# Patient Record
Sex: FEMALE | Race: WHITE | Employment: UNEMPLOYED | URBAN - METROPOLITAN AREA
[De-identification: names, ages, dates, MRNs, and addresses within clinical notes are randomized per-mention and may not be internally consistent; named-entity substitution may affect disease eponyms.]

---

## 2024-10-28 ENCOUNTER — HOSPITAL ENCOUNTER (EMERGENCY)
Age: 54
Discharge: HOME | End: 2024-10-28
Attending: EMERGENCY MEDICINE
Payer: COMMERCIAL

## 2024-10-28 VITALS
BODY MASS INDEX: 36.37 KG/M2 | HEART RATE: 88 BPM | OXYGEN SATURATION: 96 % | HEIGHT: 64 IN | SYSTOLIC BLOOD PRESSURE: 150 MMHG | RESPIRATION RATE: 22 BRPM | WEIGHT: 213 LBS | DIASTOLIC BLOOD PRESSURE: 84 MMHG | TEMPERATURE: 98.5 F

## 2024-10-28 DIAGNOSIS — L76.22 POSTOPERATIVE HEMORRHAGE OF SKIN FOLLOWING NON-DERMATOLOGIC PROCEDURE: Primary | ICD-10-CM

## 2024-10-28 PROCEDURE — 99283 EMERGENCY DEPT VISIT LOW MDM: CPT

## 2024-10-28 PROCEDURE — 6370000000 HC RX 637 (ALT 250 FOR IP)

## 2024-10-28 PROCEDURE — 2500000003 HC RX 250 WO HCPCS

## 2024-10-28 RX ORDER — LIDOCAINE HYDROCHLORIDE AND EPINEPHRINE 10; 10 MG/ML; UG/ML
20 INJECTION, SOLUTION INFILTRATION; PERINEURAL ONCE
Status: COMPLETED | OUTPATIENT
Start: 2024-10-28 | End: 2024-10-28

## 2024-10-28 RX ADMIN — LIDOCAINE HYDROCHLORIDE,EPINEPHRINE BITARTRATE 20 ML: 10; .01 INJECTION, SOLUTION INFILTRATION; PERINEURAL at 21:16

## 2024-10-28 RX ADMIN — Medication: at 21:14

## 2024-10-28 ASSESSMENT — LIFESTYLE VARIABLES
HOW MANY STANDARD DRINKS CONTAINING ALCOHOL DO YOU HAVE ON A TYPICAL DAY: PATIENT DOES NOT DRINK
HOW OFTEN DO YOU HAVE A DRINK CONTAINING ALCOHOL: NEVER

## 2024-10-28 NOTE — ED PROVIDER NOTES
THE [facility]  EMERGENCY DEPARTMENT ENCOUNTER          EM RESIDENT NOTE       Date of evaluation: 10/28/2024    Chief Complaint     Post-op Problem (Pt reports having bariatric sleeve performed this morning, allergic to surgical glue so reports having to stitch close and site was slowly oozing upon discharge, facility bandage and discharge patient, woke up this evening around 1915 to bleeding to R site)      History of Present Illness     Josselin Ceballos is a 54 y.o. female who presents for evaluation of postop bleeding.  Patient reports that earlier today she had a gastric sleeve surgery which was performed laparoscopically.  Reports that her periumbilical incision has been continuously oozing blood since she left the hospital, and she awoke this evening to bleeding through her dressing, the overlying ABD pad, and onto her bed sheets.  She called her surgeon on-call, Dr. Wu, who recommended she come to the ED for bleeding control and assessment.  Has patient states that she has allergies to adhesives and surgical glue therefore she just has sutures on the inside of her incisions.  Patient denies any other symptoms, including nausea or vomiting, uncontrolled pain, difficulty urinating, blood in the urine or stool, shortness of breath, fevers, or dizziness.  She has been tolerating a clear liquid diet.    MEDICAL DECISION MAKING / ASSESSMENT / PLAN     INITIAL VITALS: BP: (!) 150/84, Temp: 98.5 °F (36.9 °C), Pulse: 88, Respirations: 22, SpO2: 96 %    Josselin Ceballos is a 54 y.o. female who presents for evaluation of bleeding from her postop surgery site in the middle of her abdomen.  She is mildly hypertensive, afebrile, otherwise hemodynamically stable, well-appearing, and in no acute distress.  My assessment reveals slow but constant venous oozing from the medial aspect of her midline laparoscopic incision site.    No evidence of significant postop complications, peritoneal signs, systemic

## 2024-10-29 NOTE — ED PROVIDER NOTES
ED Attending Attestation Note     Date of evaluation: 10/28/2024    This patient was seen by the resident.  I have seen and examined the patient, agree with the workup, evaluation, management and diagnosis. The care plan has been discussed.  My assessment reveals awake alert female status post gastric sleeve today.  Has had oozing from one of her incisions her abdominal wall.  On exam her abdomen is soft there is no rebound.  There is 1 area that has some slight oozing of blood but it has filled pads.  Will consult general surgery.      Roberto Graham MD  10/28/24 2010

## 2024-10-29 NOTE — CONSULTS
Bariatric Surgery   Resident Consult Note    Reason for Consult: Post operative incisional bleed     History of Present Illness:   Josselin Ceballos is a 54 y.o. female with Hx of gastric sleeve with Dr. Wu done today, presents to the ED with port incisional bleed. Patient has an allergy to adhesive and therefore surgical glue was not able to be used to adhere port sites. Patient was discharged after surgery at [facility], and one of her port sites continued to bleed despite pressure to the wound. She reports no dizziness, no light headedness, no palpitations.    Past Medical History:    No past medical history on file.    Past Surgical History:    No past surgical history on file.    Allergies:  Latex, Bactrim [sulfamethoxazole-trimethoprim], Morphine, Sulfa antibiotics, Acetaminophen-codeine, Amoxicillin, Dermabond, and Oxycodone    Medications:   Home Meds  No current facility-administered medications on file prior to encounter.     No current outpatient medications on file prior to encounter.       Current Meds  No current facility-administered medications for this encounter.      Family History:   No family history on file.    Social History:   TOBACCO:   reports that she has never smoked. She has never used smokeless tobacco.  ETOH:   reports no history of alcohol use.  DRUGS:   reports no history of drug use.    Review of Systems:   A 14 point review of systems was conducted, significant findings as noted in HPI. All other systems negative.     Physical exam:    Vitals:    10/28/24 1946 10/28/24 1946   BP: (!) 150/84    Pulse: 88    Resp: 22    Temp: 98.5 °F (36.9 °C)    TempSrc: Oral    SpO2: 96%    Weight:  96.6 kg (213 lb)   Height:  1.626 m (5' 4\")       General appearance: alert, no acute distress  Eyes: No scleral icterus, EOM grossly intact  Neck: trachea midline, no JVD  Chest/Lungs: Normal effort with no accessory muscle use on RA  Cardiovascular: RRR, well perfused  Abdomen: obese, soft,

## 2024-10-29 NOTE — DISCHARGE INSTRUCTIONS
You were seen in the emergency department for postoperative bleeding that was managed with lidocaine and silver nitrate.  Please leave dressing in place until cleared to remove it.  Call [facility] tomorrow morning when the office opens to follow-up with surgeons office.    Call your doctor or return to the emergency department for recurrent postop bleeding, significant nausea or vomiting, blood in your urine or stool, chest pain or shortness of breath, or any other concerns